# Patient Record
(demographics unavailable — no encounter records)

---

## 2025-07-24 NOTE — END OF VISIT
[FreeTextEntry3] : This note was written by Nestor Agosto on 07/24/2025 actively solely MCKENNA Landon M.D. All medical record entries made by the Scribe were at my, MCKENNA Landon M.D. direction and personally dictated by me on 07/24/2025. I have personally reviewed the chart and agree that the record reflects my personal performance of the history, physical exam, assessment, and plan.

## 2025-07-24 NOTE — HISTORY OF PRESENT ILLNESS
[FreeTextEntry1] : 2025. INDIGO KAUFFMAN 69 year old female  PM presents for annual gyn exam.   She feels well and offers no complaints. She reports monthly menses, not too heavy, not painful. She denies intermenstrual bleeding. No vaginal discharge or vaginitis symptoms. No urinary complaints. BM is normal per patient. She denies abdominal and pelvic pain.   Currently sexually active with monogamous partner. She reports still experiencing vaginal dryness after Intrarosa trial - did not use regularly. She claims not doing well with vagina inserts.   OBHx:  GynHx: No Hx of STI, fibroids, ovarian cysts, abnl paps, or pelvic infections. PMH: lipoma, osteoporosis in spine SHx: breast augmentation, L thumb arthroplasty ( w/ Dr. Mims) Meds: Rosuvastatin All: NKDA Soc: Soc alcohol use, no T/D. 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 Psych: negative FHx: Mother w/ pancreatic ca, passed at age 40. Father w/ heart failure, passed at age 92. Denies FHx of breast, ovarian, uterine, pancreatic, prostate or colon cancer. PHQ9= 0 [Mammogramdate] : 6/25 [TextBox_19] : BIRADS2 [BreastSonogramDate] : 6/25 [TextBox_25] : BIRADS2 [PapSmeardate] : 1/24 [TextBox_31] : NILM, HRHPV neg [BoneDensityDate] : 12/22 [TextBox_37] : osteoporosis in spine, osteopenia in hips [TextBox_43] : scheduled for 2025

## 2025-07-24 NOTE — PLAN
[FreeTextEntry1] : Health Maintenance: 69 year old female pt presents for annual gyn exam BSE taught Reviewed diet and exercise Breast and pelvic exam performed Pap/HPV conducted Rx given for mammogram and breast sonogram, due now Colonoscopy scheduled for 2025 Advised pt to see PCP annually   Osteoporosis/Osteopenia: DXA d/w pt, due now 12/22 DXA showed osteoporosis in spine and osteopenia in hips Recommend adequate dietary calcium intake (1000 mg/day for those 19-51yo and 1200 mg/day in older women) and vitamin D supplementation (600 IU/day <71 yo, 800 IU/day 71 yo and older ), & weight-bearing exercises (i.e. walking) for 30 min at least 3x weekly Advised pt to see an endocrinologist to manage osteoporosis Recommended to f/u with PCP for repeat DXA scan for a quicker appt  GSM: Pt does not prefer vaginal inserts Advised pt to use OTC lubricants (Ky jelly, Astroglide, coconut oil). d/w pt all R/B/A Reviewed moisturizers and massages Internal exam tolerated very well  RTO in 1 yr for annual or PRN

## 2025-07-24 NOTE — PHYSICAL EXAM
[Chaperoned Physical Exam] : A chaperone was present in the examining room during all aspects of the physical examination. [Appropriately responsive] : appropriately responsive [Alert] : alert [No Acute Distress] : no acute distress [No Lymphadenopathy] : no lymphadenopathy [Regular Rate Rhythm] : regular rate rhythm [No Murmurs] : no murmurs [Clear to Auscultation B/L] : clear to auscultation bilaterally [Soft] : soft [Non-tender] : non-tender [Non-distended] : non-distended [No HSM] : No HSM [No Lesions] : no lesions [No Mass] : no mass [Oriented x3] : oriented x3 [Examination Of The Breasts] : a normal appearance [No Masses] : no breast masses were palpable [Vulvar Atrophy] : vulvar atrophy [Labia Majora] : normal [Labia Minora] : normal [Atrophy] : atrophy [Normal] : normal [Uterine Adnexae] : normal [FreeTextEntry2] : Nestor Agosto [FreeTextEntry1] : medical scribe